# Patient Record
Sex: FEMALE | Race: WHITE | URBAN - METROPOLITAN AREA
[De-identification: names, ages, dates, MRNs, and addresses within clinical notes are randomized per-mention and may not be internally consistent; named-entity substitution may affect disease eponyms.]

---

## 2017-01-23 DIAGNOSIS — R41.3 MEMORY LOSS: Primary | ICD-10-CM

## 2017-01-27 ENCOUNTER — TRANSFERRED RECORDS (OUTPATIENT)
Dept: HEALTH INFORMATION MANAGEMENT | Facility: CLINIC | Age: 57
End: 2017-01-27

## 2017-02-15 ENCOUNTER — TELEPHONE (OUTPATIENT)
Dept: NEUROLOGY | Facility: CLINIC | Age: 57
End: 2017-02-15

## 2017-04-28 ENCOUNTER — OFFICE VISIT (OUTPATIENT)
Dept: NEUROPSYCHOLOGY | Facility: CLINIC | Age: 57
End: 2017-04-28

## 2017-04-28 DIAGNOSIS — R41.840 ATTENTION OR CONCENTRATION DEFICIT: Primary | ICD-10-CM

## 2017-04-28 DIAGNOSIS — F33.2 SEVERE RECURRENT MAJOR DEPRESSION WITHOUT PSYCHOTIC FEATURES (H): ICD-10-CM

## 2017-04-28 DIAGNOSIS — F41.9 ANXIETY: ICD-10-CM

## 2017-04-28 NOTE — MR AVS SNAPSHOT
After Visit Summary   2017    Tamar Fuller    MRN: [id number]           Patient Information     Date Of Birth          1960        Visit Information        Provider Department      2017 12:45 PM Chandu Greene, PhD [facility] Neuropsychology        Today's Diagnoses     Attention or concentration deficit    -  1    Severe recurrent major depression without psychotic features (H)        Anxiety           Follow-ups after your visit        Who to contact     Please call your clinic at [phone] to:    Ask questions about your health    Make or cancel appointments    Discuss your medicines    Learn about your test results    Speak to your doctor   If you have compliments or concerns about an experience at your clinic, or if you wish to file a complaint, please contact [facility] Patient Relations at [phone] or email us at [email or web address]         Additional Information About Your Visit        MyChart Information     [employer] is an electronic gateway that provides easy, online access to your medical records. With [employer], you can request a clinic appointment, read your test results, renew a prescription or communicate with your care team.     To sign up for CIQUALt visit the website at [email or web address]   You will be asked to enter the access code listed below, as well as some personal information. Please follow the directions to create your username and password.     Your access code is: [email or web address]  Expires: 6/15/2017  6:30 AM     Your access code will  in 90 days. If you need help or a new code, please contact your [facility] or call [phone] for assistance.        Care EveryWhere ID     This is your Care EveryWhere ID. This could be used by other organizations to access your [place] medical records  [id number]         Blood Pressure from Last 3 Encounters:   10/06/16 139/77     Weight from Last 3 Encounters:   No data found for Wt              We Performed the Following     [id number] TESTING, PER HR/PSYCHOLOGIST     NEUROPSYCH TESTING BY [facility]        Primary Care Provider Office Phone # Fax #    Julieta ShermanBOB [phone]       [address] [address]        Thank you!     Thank you for choosing [facility] NEUROPSYCHOLOGY  for your care. Our goal is always to provide you with excellent care. Hearing back from our patients is one way we can continue to improve our services. Please take a few minutes to complete the written survey that you may receive in the mail after your visit with us. Thank you!             Your Updated Medication List - Protect others around you: Learn how to safely use, store and throw away your medicines at www.disposemymeds.org.          This list is accurate as of: 4/28/17 11:59 PM.  Always use your most recent med list.                   Brand Name Dispense Instructions for use    * albuterol (2.5 MG/3ML) 0.083% neb solution      Inhale 2.5 mg into the lungs as needed       * VENTOLIN  (90 BASE) MCG/ACT Inhaler   Generic drug:  albuterol      Inhale 2 puffs into the lungs as needed       buPROPion 300 MG 24 hr tablet    WELLBUTRIN XL     Take 300 mg by mouth every morning       clotrimazole 1 % cream    LOTRIMIN     Apply topically as needed       cyclobenzaprine 10 MG tablet    FLEXERIL     Take 10 mg by mouth At Bedtime       diazepam 5 MG tablet    VALIUM    2 tablet    Take 1 tablet (5 mg) by mouth See Admin Instructions One po 30 minutes before MRI.  May repeat dose at time of MRI.       * DULoxetine 30 MG EC capsule    CYMBALTA     Take 30 mg by mouth daily       * DULoxetine 60 MG EC capsule    CYMBALTA     Take 60 mg by mouth daily       oxyCODONE 10 MG IR tablet    ROXICODONE     4 times daily       potassium citrate 5 MEQ (540 MG) CR tablet    UROCIT-K     Take by mouth daily       * QUEtiapine  25 MG tablet    SEROquel     4 times daily       * QUEtiapine 300 MG tablet    SEROquel     Take 300 mg by mouth At Bedtime       * Notice:  This list has 6 medication(s) that are the same as other medications prescribed for you. Read the directions carefully, and ask your doctor or other care provider to review them with you.

## 2017-04-28 NOTE — PROGRESS NOTES
The patient was seen for neuropsychological evaluation at the request of Jonah Davila MD for the purpose of diagnostic clarification and treatment planning.  Two hours of face to face testing were provided by this writer.  Please see Dr. Chandu Greene's report for a full interpretation of the findings.    Cara Wilson  Psychometrist

## 2017-05-02 NOTE — PROGRESS NOTES
__ Orientation            Time          ___-0____        Place        ____2____        Personal Info.     ____4____    WAIS-IV   FSIQ____VCI_____PRI_____ [id number]______     Raw  Age SS  __Similarities  __19___ __7___  __Vocabulary  _______ _______  __Information  _______          _______  __Comprehension _______ _______  __Block Design  __32___ __9__  __Matrix Reas.  _______ _______  __Visual Puzzles _______ _______  __Picture Comp. _______ _______  __Figure Weights _______ _______  __Digit Span  __20___ ___7___  __Arithmetic  __10___              __7___  __L-N Sequencing _______ _______  __Symbol Search _______ _______  __Coding  __88___ __15___  __Cancellation  _______ _______    __WMS-III   LM1 = 38 SS = 10   LM2 = 23 SS = 12   LM2R = 28 Zscore = 1.13    __Reji/Rosaline Complex Figure Test    Raw  T-score   %ile  Copy   _30.5__         -                  6-10  Imm. Recall _14_  ___42___ __21__  30  Delay _14.5_  ___43___ __24__  Recognition _20__    ___47___ __38__    Time  _129 _          __BVRT  (form C)  Copy E-10 rating ____/4     Raw    Expected  Correct  ___6____ ____6___  Incorrect ___5____ ____6___    __WRAT-4 Reading   SS = 80  %ile = 9 GE = 6.9    __COWA   Raw__31___ Tscore__41___   __Animal Fluency   Raw = 16 TScore = 41  __BNT   Raw = 54 %ile =  59    __Trail Making Test   A = 31   Errors = 1 %ile = 52-65   B = 101  Errors = 1 %ile = 24-31  __Stroop   Word = 69 %ile = 10   Color = 54 %ile = 3-7   C/W = 12 %ile = <3    __BDI-II   Raw__40__ Interp.__severe___   __BAI    Raw__30__ Interp.__severe___  __MMPI-2RF    __TOMM   T1 = 44  T2 = 50

## 2017-05-02 NOTE — PROGRESS NOTES
Name: Tamar Fuller  MR#: [id number]  YOB: 1960  Date of Exam: 2017    Neuropsychology Laboratory  [facility]  [address]  [place]  [address] [phone]  NEUROPSYCHOLOGICAL EVALUATION    IDENTIFYING INFORMATION  Tamar Fuller is a 57 year old, right handed, senior , with 12 years of formal education. She was unaccompanied to the evaluation.    BACKGROUND INFORMATION / INTERVIEW FINDINGS    Records indicate that Ms. Fuller s medical history includes neuropathy, carpal tunnel syndrome, depression, anxiety, and untreated sleep apnea. She also has chronic pain. She has expressed concerns about a change in her cognition, and memory in particular. MRI of her brain on 2017 at [facility] documented  Mild cerebral volume loss. Minimal burden punctate periventricular and subcortical foci of T2/FLAIR hyperintensity within the white matter which are nonspecific though likely related to chronic small vessel ischemia.  The current evaluation was requested by Dr. Jonah Davila, in this context.    On interview, Ms. Fuller reported that she began noticing a slow decline in her thinking 11 years ago. She stated that her father  in , and there were a number of stressors involved after his passing. She indicated that her mother  unexpectedly in , which again led to a number of stressors. She stated that her work was also stressful at that time. She reported that she suffered from a  passing out  event at work in 2009, and the subsequent workup was unrevealing. She reported that her house went into foreclore in . With respect to current cognitive concerns, she stated that her primary concern is with forgetting. She described instances at work in which she will go to perform a task and her output will be wrong even though she seemed to know what to do. She reported that her thinking is variable and some days are better  than others. She indicated that when she is nervous, she tends to slur her speech. She reported that she  feels under the microscope  at work. Regarding mental health, she stated that she has been diagnosed with depression and anxiety. She indicated that she first struggled with depression and anxiety as a child. She stated that she did not suffer from abuse, but her parents were strict. She reported that she has anxiety attacks at work. She is under the care of a therapist and sees this provider every three months. She has never seen a psychiatrist, but has medications for depression and anxiety that are prescribed by a nurse practitioner.     Regarding other medical background, Ms. Fuller stated that she suffered a concussion at age 3 when she fell of a slide and struck her head. She indicated that she is not certain if she lost consciousness as a result of this injury. She denied prior stroke or seizure. She reported that she has some  kidney issues  and had testing last week. Per records, her current medications include albuterol, bupropion, clotrimazole, cyclobenzaprine, diazepam, duloxetine, oxycodone, potassium citrate, and quetiapine. She denied alcohol, tobacco, or illicit drug use. She reported that she gets 8 hours of sleep per night. She indicated that she goes to a pain clinic at [facility]. She rated her pain at a 5/10 (10 being high) at the time of the interview.     Ms. Fuller lives in her own home. She manages her own basic and instrumental daily activities. She drives. By way of background, she has never been . She has a 38 year old daughter who lives nearby. She graduated from high school with average grades. She works full time for [employer] as a senior . She has held this position for 4 years. She worked in a similar role for 13 years prior to her current job. She also worked as an  for several years.     BEHAVIORAL OBSERVATIONS  Ms.  Jonny was polite and cooperative with the exam. Her speech was normal. Comprehension was normal. Thought processes were unremarkable. Mood was highly anxious with congruent affect. She had a panic attack during testing. Her effort was good. The current results are felt to be a broadly accurate representation of her cognitive functioning.     RESULTS OF EXAM  Her performances on measures of neuropsychological functioning were as follows:      She was fully oriented to time, place, and various aspects of personal information. Performance on a measure of single word reading was low average. She obtained technically passing scores on stand-alone and embedded metrics of cognitive performance validity, although it should be noted that her initial trial performance on the stand-alone measure was below criterion. Auditory attention for digits was low average. Mental calculations were low average. Learning of story information was average. Delayed recall of this information was high average. Delayed recognition of story information was high average. Immediate memory for simple geometric figures was normal. Her drawing of a complicated geometric figure was performed slightly below expectation and was notable for a hurried approach with inattention to the figure s details. Short delayed recall of the figure was low average. 30  delayed recall of the figure was low average. Delayed recognition of the figure s elements was average. Visual problem solving with blocks was average. Comprehension of phrases and short stories was impaired. Verbal associative fluency was low average. Semantic verbal fluency was low average. Naming to confrontation was average. Verbal abstract reasoning was low average. Speeded visual sequencing under focused attention was average. A similar measure with a divided attention component was performed in the low average to average range. Speeded word reading was low average. Speeded color naming was  borderline impaired. Speeded inhibition of an overlearned response was impaired. Speeded visuomotor coding was superior.     She endorsed items consistent with severe symptoms of depression and severe symptoms of anxiety on self-report measures. On a longer measure of personality and emotional functioning, she responded in a manner that is consistent with potential over-reporting of symptoms (particularly those related to physical and memory concerns), and considerable emotional distress. Interpreted cautiously, clinical scale elevations were consistent with depression and possible thought disorder. Those who respond similarly may have a somatically focused depression with few positive emotions, persecutory ideation, highly elevated anxiety, and have had unusual experiences. Other elevations were consistent with feelings of helplessness, inefficacy, stress, worry, anxiety, and proneness to anger. Additional responses were similar to those who tend to act out, have familial discord, and have disinterest in affiliating with others.      IMPRESSIONS  Ms. Fuller demonstrated a pattern of weaknesses that raises some question about mild compromise of frontal and subcortical circuits. While the etiology is not certain, this pattern of compromise can be seen in individuals with cerebrovascular disease. However, she is severely depressed and severely anxious. It is undoubtedly the case that psychological factors are contributing to the clinical picture in terms of variability in the current exam as well as to her concerns about cognitive dysfunction in day-to-day life. In this exam, weaknesses were identified in some aspects of attention, speeded word retrieval, and executive functioning. Other cognitive abilities, including anterograde memory, were entirely normal. That said, it could be the case that her weaknesses in attention could be perceived as memory difficulties in daily life. In any case, I would predict a  reduction in her subjective concerns about cognitive dysfunction following improved management of her mental health.    RECOMMENDATIONS  Preliminary results and recommendations were provided to the patient over the telephone on 05/02/2017, and all questions were answered.    1. She would benefit from referral to a psychiatrist. In spite of treatment, she remains severely depressed and anxious.    2. Along similar lines, she would benefit from increased frequency of psychotherapy sessions.     3. If she continues to have difficulties with memory, routine use of a memory notebook or other assistive device could be of benefit.     4. Given the mild abnormalities, follow-up neuropsychological evaluation is recommended in 1 year in order to assess and update recommendations as appropriate. The current results can be used as a baseline at that time. However, I would be pleased to evaluate sooner if changes are noted or if clinically indicated.     Chandu Greene, Ph.D., L.P., ABPP-CN   / Licensed Psychologist [id number]  Department of Physical Medicine & Rehabilitation  [facility]    Time spent:  four hours professional time, including interview, record review, data integration, and report writing (CPT 12535); three hours of testing administered by a psychometrist and interpreted by a neuropsychologist (CPT 54622). Diagnoses: R41.840, F33.2, F41.9.

## 2017-05-10 ENCOUNTER — TELEPHONE (OUTPATIENT)
Dept: NEUROLOGY | Facility: CLINIC | Age: 57
End: 2017-05-10

## 2017-05-10 NOTE — TELEPHONE ENCOUNTER
I called pt with neuropsych report and reviewed conclusions with her.  Recommend f/u visit to go over in more detail.  She will arrange.

## 2017-05-25 ENCOUNTER — OFFICE VISIT (OUTPATIENT)
Dept: NEUROLOGY | Facility: CLINIC | Age: 57
End: 2017-05-25

## 2017-05-25 DIAGNOSIS — R41.3 MEMORY LOSS: Primary | ICD-10-CM

## 2017-05-25 NOTE — MR AVS SNAPSHOT
After Visit Summary   2017    Tamar Fuller    MRN: [id number]           Patient Information     Date Of Birth          1960        Visit Information        Provider Department      2017 4:00 PM Jonah Davila MD [facility] Neurology Clinic        Today's Diagnoses     Memory loss    -  1       Follow-ups after your visit        Follow-up notes from your care team     Return if symptoms worsen or fail to improve.      Who to contact     Please call your clinic at [phone] to:    Ask questions about your health    Make or cancel appointments    Discuss your medicines    Learn about your test results    Speak to your doctor   If you have compliments or concerns about an experience at your clinic, or if you wish to file a complaint, please contact [facility] Patient Relations at [phone] or email us at [email or web address]         Additional Information About Your Visit        MyChart Information     [facility] is an electronic gateway that provides easy, online access to your medical records. With [employer]apper, you can request a clinic appointment, read your test results, renew a prescription or communicate with your care team.     To sign up for [facility] visit the website at [email or web address]   You will be asked to enter the access code listed below, as well as some personal information. Please follow the directions to create your username and password.     Your access code is: [email or web address]  Expires: 6/15/2017  6:30 AM     Your access code will  in 90 days. If you need help or a new code, please contact your [facility] or call [phone] for assistance.        Care EveryWhere ID     This is your Care EveryWhere ID. This could be used by other organizations to access your [place] medical records  [id number]         Blood Pressure from Last 3 Encounters:   10/06/16  139/77    Weight from Last 3 Encounters:   No data found for Wt              Today, you had the following     No orders found for display       Primary Care Provider Office Phone # Fax #    Julieta Sherman [phone]       [address] [address]        Thank you!     Thank you for choosing [facility] NEUROLOGY CLINIC  for your care. Our goal is always to provide you with excellent care. Hearing back from our patients is one way we can continue to improve our services. Please take a few minutes to complete the written survey that you may receive in the mail after your visit with us. Thank you!             Your Updated Medication List - Protect others around you: Learn how to safely use, store and throw away your medicines at www.disposemymeds.org.          This list is accurate as of: 5/25/17 11:59 PM.  Always use your most recent med list.                   Brand Name Dispense Instructions for use    * albuterol (2.5 MG/3ML) 0.083% neb solution      Inhale 2.5 mg into the lungs as needed       * VENTOLIN  (90 BASE) MCG/ACT Inhaler   Generic drug:  albuterol      Inhale 2 puffs into the lungs as needed       buPROPion 300 MG 24 hr tablet    WELLBUTRIN XL     Take 300 mg by mouth every morning       clotrimazole 1 % cream    LOTRIMIN     Apply topically as needed       cyclobenzaprine 10 MG tablet    FLEXERIL     Take 10 mg by mouth At Bedtime       diazepam 5 MG tablet    VALIUM    2 tablet    Take 1 tablet (5 mg) by mouth See Admin Instructions One po 30 minutes before MRI.  May repeat dose at time of MRI.       * DULoxetine 30 MG EC capsule    CYMBALTA     Take 30 mg by mouth daily       * DULoxetine 60 MG EC capsule    CYMBALTA     Take 60 mg by mouth daily       oxyCODONE 10 MG IR tablet    ROXICODONE     4 times daily       potassium citrate 5 MEQ (540 MG) CR tablet    UROCIT-K     Take by mouth daily       * QUEtiapine 25  MG tablet    SEROquel     4 times daily       * QUEtiapine 300 MG tablet    SEROquel     Take 300 mg by mouth At Bedtime       * Notice:  This list has 6 medication(s) that are the same as other medications prescribed for you. Read the directions carefully, and ask your doctor or other care provider to review them with you.

## 2017-05-25 NOTE — LETTER
"5/25/2017       RE: Tamar Fuller  [address] [place] [address]     Dear Colleague,    Thank you for referring your patient, Tamar Fuller, to the [facility] NEUROLOGY CLINIC at [facility]. Please see a copy of my visit note below.    INTERVAL HISTORY:   This patient is seen in followup with issues of memory loss.  I saw her for this in October.  At that time, I referred her for an MRI of the brain and also for neuropsych testing.  I had also ordered a vitamin B12 level and TSH.  She never got the labs drawn.  The MRI of the brain was performed in January.  I reviewed the images with her today.  It is basically a normal MRI of the brain, showing mild age-related change.  There is no evidence for stroke.  Her neuropsychometric testing was done in April.  I had reviewed the report with her by telephone and today I went over it in detail in the clinic.  It does demonstrate weaknesses and mild compromise of frontal and subcortical circuits.  Differential diagnosis includes stroke or psychiatric problems including depression and anxiety.  There is no evidence for stroke on the MRI.  However, the patient is severely depressed and anxious.  She says she has been depressed since childhood.  The neuropsych report indicates that it is \"undoubtedly the case that psychological factors are contributing to the clinical picture in terms of variability in the current exam as well as to her concerns about cognitive function and day-to-day life.\"      PHYSICAL EXAMINATION:   VITAL SIGS:   Her blood pressure is 135/80.      ASSESSMENT:   1.  Cognitive impairment, likely due to psychological factors.      DISCUSSION:  The patient is seen in followup with ongoing issues of cognitive impairment.  She works as a senior .  She is making mistakes in the workplace.  She has to codify public, confidential and seal for various papers.  She gets them mixed " up and makes mistakes.  This becomes quite a problem.  She is worried about her job status.      I had a long talk with the patient today about this.  I think that the major reason for this problem has to do with her psychological issues of depression and anxiety.  I find no evidence for dementia or stroke in her evaluation.  She should have her B12 level and TSH checked, as I had ordered months ago.  She is going to have to followup with primary care in this regard and also with her psychiatrist.  I can see her on an as-needed basis.      This was a 25-minute appointment, more than half of which was spent in counseling and coordination of care.      Jonah Davila MD      cc:   TRINY Vasquez, [facility]    [address] [place] [id number]           D: 2017 16:00   T: 2017 16:14   MT: AKA      Name:     MEME BIRCH   MRN:      [id number]        Account:      [id number]   :      1960           Service Date: 2017      Document: [id number]

## 2017-05-25 NOTE — LETTER
May 25, 2017      TO: Tamar Fuller  [address] [place] [address]         To whom it may concern:     Tamar Fuller is a patient I see for cognitive impairment.  She has had neuropsychological testing for this.  It validates the concern about inattention and memory impairmnent.  Psychological factors are playing a major contributing role in this situation.  I would defer to her psychiatrist for treatment of these issues.     Sincerely,      Jonah Davila MD

## 2017-05-25 NOTE — PROGRESS NOTES
"INTERVAL HISTORY:   This patient is seen in followup with issues of memory loss.  I saw her for this in October.  At that time, I referred her for an MRI of the brain and also for neuropsych testing.  I had also ordered a vitamin B12 level and TSH.  She never got the labs drawn.  The MRI of the brain was performed in January.  I reviewed the images with her today.  It is basically a normal MRI of the brain, showing mild age-related change.  There is no evidence for stroke.  Her neuropsychometric testing was done in April.  I had reviewed the report with her by telephone and today I went over it in detail in the clinic.  It does demonstrate weaknesses and mild compromise of frontal and subcortical circuits.  Differential diagnosis includes stroke or psychiatric problems including depression and anxiety.  There is no evidence for stroke on the MRI.  However, the patient is severely depressed and anxious.  She says she has been depressed since childhood.  The neuropsych report indicates that it is \"undoubtedly the case that psychological factors are contributing to the clinical picture in terms of variability in the current exam as well as to her concerns about cognitive function and day-to-day life.\"      PHYSICAL EXAMINATION:   VITAL SIGS:   Her blood pressure is 135/80.      ASSESSMENT:   1.  Cognitive impairment, likely due to psychological factors.      DISCUSSION:  The patient is seen in followup with ongoing issues of cognitive impairment.  She works as a senior .  She is making mistakes in the workplace.  She has to codify public, confidential and seal for various papers.  She gets them mixed up and makes mistakes.  This becomes quite a problem.  She is worried about her job status.      I had a long talk with the patient today about this.  I think that the major reason for this problem has to do with her psychological issues of depression and anxiety.  I find no evidence for dementia or stroke in " her evaluation.  She should have her B12 level and TSH checked, as I had ordered months ago.  She is going to have to followup with primary care in this regard and also with her psychiatrist.  I can see her on an as-needed basis.      This was a 25-minute appointment, more than half of which was spent in counseling and coordination of care.      Gerson Davila MD      cc:   TRINY Vasquez, [facility]    [address]   [place] [address]         GERSON DAVILA MD             D: 2017 16:00   T: 2017 16:14   MT: AKA      Name:     MEME BIRCH   MRN:      [id number]        Account:      [id number]   :      1960           Service Date: 2017      Document: [id number]